# Patient Record
Sex: MALE | Race: WHITE
[De-identification: names, ages, dates, MRNs, and addresses within clinical notes are randomized per-mention and may not be internally consistent; named-entity substitution may affect disease eponyms.]

---

## 2019-11-14 ENCOUNTER — HOSPITAL ENCOUNTER (OUTPATIENT)
Dept: HOSPITAL 88 - MRI | Age: 54
End: 2019-11-14
Attending: SPECIALIST
Payer: COMMERCIAL

## 2019-11-14 DIAGNOSIS — M23.91: Primary | ICD-10-CM

## 2019-11-14 NOTE — DIAGNOSTIC IMAGING REPORT
TECHNIQUE:

Magnetic resonance imaging of the  RIGHT  Knee was performed WITHOUT

intravenous contrast. 



COMPARISON: None.



FINDINGS: 



Joint effusion: No joint effusion is present. Hoffa's fat pad is unremarkable.

There is a 1.7 x 0.8 cm Baker's cyst.



Menisci: The medial and lateral meniscus both appear normal. No meniscal tear

is seen.



Tendons and Ligaments: The ACL and the PCL are intact. The collateral ligaments

are unremarkable. The iliotibial band is unremarkable.  The extensor mechanism

appears normal.



Articular Cartilage: The articular cartilage is normal in thickness. No focal

defects are seen.



Bone: The bone and bone marrow are normal. 



IMPRESSION: 

No acute osseous or ligamentous injury.



1.7 x 0.8 cm Baker's cyst.



Signed by: Vincent Shipley MD on 11/14/2019 3:26 PM